# Patient Record
Sex: FEMALE | Race: WHITE | NOT HISPANIC OR LATINO | URBAN - METROPOLITAN AREA
[De-identification: names, ages, dates, MRNs, and addresses within clinical notes are randomized per-mention and may not be internally consistent; named-entity substitution may affect disease eponyms.]

---

## 2017-09-06 ENCOUNTER — INPATIENT (INPATIENT)
Facility: HOSPITAL | Age: 37
LOS: 2 days | Discharge: HOME | End: 2017-09-09
Attending: OBSTETRICS & GYNECOLOGY | Admitting: OBSTETRICS & GYNECOLOGY

## 2017-09-06 DIAGNOSIS — O99.89 OTHER SPECIFIED DISEASES AND CONDITIONS COMPLICATING PREGNANCY, CHILDBIRTH AND THE PUERPERIUM: ICD-10-CM

## 2017-09-12 DIAGNOSIS — Z34.83 ENCOUNTER FOR SUPERVISION OF OTHER NORMAL PREGNANCY, THIRD TRIMESTER: ICD-10-CM

## 2017-09-12 DIAGNOSIS — Z3A.38 38 WEEKS GESTATION OF PREGNANCY: ICD-10-CM

## 2019-03-25 ENCOUNTER — ASOB RESULT (OUTPATIENT)
Age: 39
End: 2019-03-25

## 2019-03-25 ENCOUNTER — APPOINTMENT (OUTPATIENT)
Dept: ANTEPARTUM | Facility: CLINIC | Age: 39
End: 2019-03-25
Payer: COMMERCIAL

## 2019-03-25 PROCEDURE — 76817 TRANSVAGINAL US OBSTETRIC: CPT | Mod: 77

## 2019-04-01 ENCOUNTER — APPOINTMENT (OUTPATIENT)
Dept: ANTEPARTUM | Facility: CLINIC | Age: 39
End: 2019-04-01

## 2019-12-02 ENCOUNTER — ASOB RESULT (OUTPATIENT)
Age: 39
End: 2019-12-02

## 2019-12-02 ENCOUNTER — APPOINTMENT (OUTPATIENT)
Dept: ANTEPARTUM | Facility: CLINIC | Age: 39
End: 2019-12-02
Payer: COMMERCIAL

## 2019-12-02 PROCEDURE — 76811 OB US DETAILED SNGL FETUS: CPT

## 2020-01-22 ENCOUNTER — APPOINTMENT (OUTPATIENT)
Dept: ANTEPARTUM | Facility: CLINIC | Age: 40
End: 2020-01-22
Payer: COMMERCIAL

## 2020-01-22 ENCOUNTER — ASOB RESULT (OUTPATIENT)
Age: 40
End: 2020-01-22

## 2020-01-22 PROCEDURE — 76816 OB US FOLLOW-UP PER FETUS: CPT

## 2020-02-24 ENCOUNTER — APPOINTMENT (OUTPATIENT)
Dept: ANTEPARTUM | Facility: CLINIC | Age: 40
End: 2020-02-24

## 2020-03-23 ENCOUNTER — INPATIENT (INPATIENT)
Facility: HOSPITAL | Age: 40
LOS: 1 days | Discharge: HOME | End: 2020-03-25
Attending: OBSTETRICS & GYNECOLOGY | Admitting: OBSTETRICS & GYNECOLOGY
Payer: MEDICAID

## 2020-03-23 VITALS
RESPIRATION RATE: 18 BRPM | WEIGHT: 169.98 LBS | DIASTOLIC BLOOD PRESSURE: 59 MMHG | TEMPERATURE: 98 F | SYSTOLIC BLOOD PRESSURE: 101 MMHG | HEIGHT: 68 IN | HEART RATE: 74 BPM

## 2020-03-23 LAB
AMPHET UR-MCNC: NEGATIVE — SIGNIFICANT CHANGE UP
APPEARANCE UR: CLEAR — SIGNIFICANT CHANGE UP
BARBITURATES UR SCN-MCNC: NEGATIVE — SIGNIFICANT CHANGE UP
BASOPHILS # BLD AUTO: 0.02 K/UL — SIGNIFICANT CHANGE UP (ref 0–0.2)
BASOPHILS NFR BLD AUTO: 0.2 % — SIGNIFICANT CHANGE UP (ref 0–1)
BENZODIAZ UR-MCNC: NEGATIVE — SIGNIFICANT CHANGE UP
BILIRUB UR-MCNC: NEGATIVE — SIGNIFICANT CHANGE UP
BLD GP AB SCN SERPL QL: SIGNIFICANT CHANGE UP
BUPRENORPHINE SCREEN, URINE RESULT: NEGATIVE — SIGNIFICANT CHANGE UP
COCAINE METAB.OTHER UR-MCNC: NEGATIVE — SIGNIFICANT CHANGE UP
COLOR SPEC: SIGNIFICANT CHANGE UP
DIFF PNL FLD: NEGATIVE — SIGNIFICANT CHANGE UP
EOSINOPHIL # BLD AUTO: 0.02 K/UL — SIGNIFICANT CHANGE UP (ref 0–0.7)
EOSINOPHIL NFR BLD AUTO: 0.2 % — SIGNIFICANT CHANGE UP (ref 0–8)
FENTANYL UR QL: NEGATIVE — SIGNIFICANT CHANGE UP
GLUCOSE UR QL: NEGATIVE — SIGNIFICANT CHANGE UP
HCT VFR BLD CALC: 37.4 % — SIGNIFICANT CHANGE UP (ref 37–47)
HGB BLD-MCNC: 12.9 G/DL — SIGNIFICANT CHANGE UP (ref 12–16)
IMM GRANULOCYTES NFR BLD AUTO: 0.7 % — HIGH (ref 0.1–0.3)
KETONES UR-MCNC: NEGATIVE — SIGNIFICANT CHANGE UP
L&D DRUG SCREEN, URINE: SIGNIFICANT CHANGE UP
LEUKOCYTE ESTERASE UR-ACNC: NEGATIVE — SIGNIFICANT CHANGE UP
LYMPHOCYTES # BLD AUTO: 1.07 K/UL — LOW (ref 1.2–3.4)
LYMPHOCYTES # BLD AUTO: 11.8 % — LOW (ref 20.5–51.1)
MCHC RBC-ENTMCNC: 31.8 PG — HIGH (ref 27–31)
MCHC RBC-ENTMCNC: 34.5 G/DL — SIGNIFICANT CHANGE UP (ref 32–37)
MCV RBC AUTO: 92.1 FL — SIGNIFICANT CHANGE UP (ref 81–99)
METHADONE UR-MCNC: NEGATIVE — SIGNIFICANT CHANGE UP
MONOCYTES # BLD AUTO: 0.69 K/UL — HIGH (ref 0.1–0.6)
MONOCYTES NFR BLD AUTO: 7.6 % — SIGNIFICANT CHANGE UP (ref 1.7–9.3)
NEUTROPHILS # BLD AUTO: 7.23 K/UL — HIGH (ref 1.4–6.5)
NEUTROPHILS NFR BLD AUTO: 79.5 % — HIGH (ref 42.2–75.2)
NITRITE UR-MCNC: NEGATIVE — SIGNIFICANT CHANGE UP
NRBC # BLD: 0 /100 WBCS — SIGNIFICANT CHANGE UP (ref 0–0)
OPIATES UR-MCNC: NEGATIVE — SIGNIFICANT CHANGE UP
OXYCODONE UR-MCNC: NEGATIVE — SIGNIFICANT CHANGE UP
PCP UR-MCNC: NEGATIVE — SIGNIFICANT CHANGE UP
PH UR: 7.5 — SIGNIFICANT CHANGE UP (ref 5–8)
PLATELET # BLD AUTO: 173 K/UL — SIGNIFICANT CHANGE UP (ref 130–400)
PRENATAL SYPHILIS TEST: SIGNIFICANT CHANGE UP
PROPOXYPHENE QUALITATIVE URINE RESULT: NEGATIVE — SIGNIFICANT CHANGE UP
PROT UR-MCNC: NEGATIVE — SIGNIFICANT CHANGE UP
RBC # BLD: 4.06 M/UL — LOW (ref 4.2–5.4)
RBC # FLD: 13.5 % — SIGNIFICANT CHANGE UP (ref 11.5–14.5)
SP GR SPEC: 1.01 — LOW (ref 1.01–1.02)
UROBILINOGEN FLD QL: SIGNIFICANT CHANGE UP
WBC # BLD: 9.09 K/UL — SIGNIFICANT CHANGE UP (ref 4.8–10.8)
WBC # FLD AUTO: 9.09 K/UL — SIGNIFICANT CHANGE UP (ref 4.8–10.8)

## 2020-03-23 PROCEDURE — 59409 OBSTETRICAL CARE: CPT | Mod: U7

## 2020-03-23 RX ORDER — SIMETHICONE 80 MG/1
80 TABLET, CHEWABLE ORAL EVERY 4 HOURS
Refills: 0 | Status: DISCONTINUED | OUTPATIENT
Start: 2020-03-23 | End: 2020-03-25

## 2020-03-23 RX ORDER — AMPICILLIN TRIHYDRATE 250 MG
2 CAPSULE ORAL ONCE
Refills: 0 | Status: COMPLETED | OUTPATIENT
Start: 2020-03-23 | End: 2020-03-23

## 2020-03-23 RX ORDER — ACETAMINOPHEN 500 MG
975 TABLET ORAL
Refills: 0 | Status: DISCONTINUED | OUTPATIENT
Start: 2020-03-23 | End: 2020-03-25

## 2020-03-23 RX ORDER — AMPICILLIN TRIHYDRATE 250 MG
CAPSULE ORAL
Refills: 0 | Status: DISCONTINUED | OUTPATIENT
Start: 2020-03-23 | End: 2020-03-23

## 2020-03-23 RX ORDER — MAGNESIUM HYDROXIDE 400 MG/1
30 TABLET, CHEWABLE ORAL
Refills: 0 | Status: DISCONTINUED | OUTPATIENT
Start: 2020-03-23 | End: 2020-03-25

## 2020-03-23 RX ORDER — IBUPROFEN 200 MG
600 TABLET ORAL EVERY 6 HOURS
Refills: 0 | Status: DISCONTINUED | OUTPATIENT
Start: 2020-03-23 | End: 2020-03-25

## 2020-03-23 RX ORDER — AER TRAVELER 0.5 G/1
1 SOLUTION RECTAL; TOPICAL EVERY 4 HOURS
Refills: 0 | Status: DISCONTINUED | OUTPATIENT
Start: 2020-03-23 | End: 2020-03-25

## 2020-03-23 RX ORDER — KETOROLAC TROMETHAMINE 30 MG/ML
30 SYRINGE (ML) INJECTION ONCE
Refills: 0 | Status: DISCONTINUED | OUTPATIENT
Start: 2020-03-23 | End: 2020-03-23

## 2020-03-23 RX ORDER — OXYCODONE HYDROCHLORIDE 5 MG/1
5 TABLET ORAL
Refills: 0 | Status: DISCONTINUED | OUTPATIENT
Start: 2020-03-23 | End: 2020-03-25

## 2020-03-23 RX ORDER — GLYCERIN ADULT
1 SUPPOSITORY, RECTAL RECTAL AT BEDTIME
Refills: 0 | Status: DISCONTINUED | OUTPATIENT
Start: 2020-03-23 | End: 2020-03-25

## 2020-03-23 RX ORDER — IBUPROFEN 200 MG
600 TABLET ORAL EVERY 6 HOURS
Refills: 0 | Status: COMPLETED | OUTPATIENT
Start: 2020-03-23 | End: 2021-02-19

## 2020-03-23 RX ORDER — DIPHENHYDRAMINE HCL 50 MG
25 CAPSULE ORAL EVERY 6 HOURS
Refills: 0 | Status: DISCONTINUED | OUTPATIENT
Start: 2020-03-23 | End: 2020-03-25

## 2020-03-23 RX ORDER — OXYTOCIN 10 UNIT/ML
333.33 VIAL (ML) INJECTION
Qty: 20 | Refills: 0 | Status: DISCONTINUED | OUTPATIENT
Start: 2020-03-23 | End: 2020-03-23

## 2020-03-23 RX ORDER — LANOLIN
1 OINTMENT (GRAM) TOPICAL EVERY 6 HOURS
Refills: 0 | Status: DISCONTINUED | OUTPATIENT
Start: 2020-03-23 | End: 2020-03-25

## 2020-03-23 RX ORDER — OXYTOCIN 10 UNIT/ML
1 VIAL (ML) INJECTION
Qty: 30 | Refills: 0 | Status: DISCONTINUED | OUTPATIENT
Start: 2020-03-23 | End: 2020-03-23

## 2020-03-23 RX ORDER — SODIUM CHLORIDE 9 MG/ML
1000 INJECTION, SOLUTION INTRAVENOUS
Refills: 0 | Status: DISCONTINUED | OUTPATIENT
Start: 2020-03-23 | End: 2020-03-23

## 2020-03-23 RX ORDER — OXYTOCIN 10 UNIT/ML
41.67 VIAL (ML) INJECTION
Qty: 20 | Refills: 0 | Status: DISCONTINUED | OUTPATIENT
Start: 2020-03-23 | End: 2020-03-25

## 2020-03-23 RX ORDER — BENZOCAINE 10 %
1 GEL (GRAM) MUCOUS MEMBRANE EVERY 6 HOURS
Refills: 0 | Status: DISCONTINUED | OUTPATIENT
Start: 2020-03-23 | End: 2020-03-25

## 2020-03-23 RX ORDER — OXYCODONE HYDROCHLORIDE 5 MG/1
5 TABLET ORAL ONCE
Refills: 0 | Status: DISCONTINUED | OUTPATIENT
Start: 2020-03-23 | End: 2020-03-25

## 2020-03-23 RX ORDER — DIBUCAINE 1 %
1 OINTMENT (GRAM) RECTAL EVERY 6 HOURS
Refills: 0 | Status: DISCONTINUED | OUTPATIENT
Start: 2020-03-23 | End: 2020-03-25

## 2020-03-23 RX ORDER — HYDROCORTISONE 1 %
1 OINTMENT (GRAM) TOPICAL EVERY 6 HOURS
Refills: 0 | Status: DISCONTINUED | OUTPATIENT
Start: 2020-03-23 | End: 2020-03-25

## 2020-03-23 RX ORDER — AMPICILLIN TRIHYDRATE 250 MG
1 CAPSULE ORAL EVERY 4 HOURS
Refills: 0 | Status: DISCONTINUED | OUTPATIENT
Start: 2020-03-23 | End: 2020-03-23

## 2020-03-23 RX ADMIN — Medication 216 GRAM(S): at 13:50

## 2020-03-23 RX ADMIN — Medication 1 MILLIUNIT(S)/MIN: at 16:04

## 2020-03-23 RX ADMIN — Medication 208 GRAM(S): at 17:50

## 2020-03-23 NOTE — OB PROVIDER H&P - HISTORY OF PRESENT ILLNESS
38y.o.  @ [address] [place] [address] 38y.o.  @ 38.6wks by 8wk charmaine presents c/o LOF at 0930am, clear fluid. Pt reports occ ctx, denies VB and states good FM.

## 2020-03-23 NOTE — OB PROVIDER H&P - NSHPPHYSICALEXAM_GEN_ALL_CORE
T(C): 36.8 (03-23-20 @ 13:21), Max: 36.8 (03-23-20 @ 13:21)  HR: 74 (03-23-20 @ 13:31) (74 - 74)  BP: 101/59 (03-23-20 @ 13:31) (101/59 - 101/59)  RR: 18 (03-23-20 @ 13:21) (18 - 18)    VE: Grossly ruptured, clear fluid  VE: 2/70/-2 by Dr. Bailon  Ctx: irregular ctx noted  FHR: 130 moderate variability, Cat I

## 2020-03-23 NOTE — OB PROVIDER H&P - NSHPLABSRESULTS_GEN_ALL_CORE
Sono @ 25.4wks S=D, post placenta previa, nl anatomy    GCT 72 Sono @ 25.4wks S=D, post placenta previa, nl anatomy  Sono @ 30.1wks S=D, EFW: 1564g, 3lb7oz (46%), post placenta, no previa    GCT 72

## 2020-03-23 NOTE — OB PROVIDER H&P - NS_OBGYNHISTORY_OBGYN_ALL_OB_FT
x 7, FT largest 8lb4oz   SAB x 1 w/ D&C      Denies STI, PID, abnl PAP, fibroids, cysts  x 7, FT largest 8lb4oz   SAB x 1 no D&C, 1st trimester      Denies STI, PID, abnl PAP, fibroids, cysts

## 2020-03-23 NOTE — OB RN PATIENT PROFILE - NS_ADMITROM_OBGYN_ALL_OB
2 week check. Practiced insertion a little more. Changed dome size to make it slide in easier. 16 hours/day wear time.    Hearing Aid Summary          Specifications  Dome Size - Left: Small Open  Dome Size - Right: Small Open          Tried setting up Bluetooth but patient needs password for [employer].  
Yes

## 2020-03-23 NOTE — OB PROVIDER DELIVERY SUMMARY - NSPROVIDERDELIVERYNOTE_OBGYN_ALL_OB_FT
Patient was fully dilated and pushing. Fetal head was OA and restituted to ROT. The anterior and posterior shoulders delivered, followed by the remaining body atraumatically. Delayed cord clamping was performed, and then clamped and cut. Cord blood gases collected x2. The  was handed to the mother. The placenta delivered intact with membranes. Pitocin was administered. Uterus massaged, fundus found to be firm. Cervix, vagina and perineum inspected. First degree laceration noted, repaired using 3-0 chromic in the usual fashion with good hemostasis.     Viable female infant delivered, with APGARs 9/9    Laceration: first degree laceration   cc

## 2020-03-23 NOTE — OB PROVIDER H&P - ASSESSMENT
38y.o.  @ 38.6wks PROM, AMA, Grandmultiparity  Admit to L&D  IVF, labs  Continuous efm and toco  Pain management PRN  Anticipate   Dr. Bailon Aware

## 2020-03-23 NOTE — PROGRESS NOTE ADULT - SUBJECTIVE AND OBJECTIVE BOX
PGY1 Note    Patient seen at bedside for evaluation of labor progression, doing well, no complaints. Patient refusing epidural at this time.    Vital Signs Last 24 Hrs  T(C): 36.8 (23 Mar 2020 13:21), Max: 36.8 (23 Mar 2020 13:21)  T(F): 98.3 (23 Mar 2020 13:21), Max: 98.3 (23 Mar 2020 13:21)  HR: 71 (23 Mar 2020 15:39) (71 - 74)  BP: 102/51 (23 Mar 2020 15:39) (101/59 - 102/51)  RR: 18 (23 Mar 2020 13:21) (18 - 18)    EFM: 140/mod lily/+early decels, +accels  TOCO:q3-4 mins  SVE: 5/90/-1, vtx, PROM clear @0900    Medications:  ampicillin  IVPB: 216 mL/Hr (20 @ 13:50)  ampicillin  IVPB: 208 mL/Hr (20 @ 17:50)  oxytocin Infusion: 1 mL/Hr (20 @ 15:53) currently @3mU/min      Labs:                        12.9   9.09  )-----------( 173      ( 23 Mar 2020 14:00 )             37.4           ABO RH Interpretation: O POS (20 @ 14:06)    Antibody Screen: NEG (20 @ 14:06)    Urinalysis Basic - ( 23 Mar 2020 14:00 )    Color: Light Yellow / Appearance: Clear / S.008 / pH: x  Gluc: x / Ketone: Negative  / Bili: Negative / Urobili: <2 mg/dL   Blood: x / Protein: Negative / Nitrite: Negative   Leuk Esterase: Negative / RBC: x / WBC x   Sq Epi: x / Non Sq Epi: x / Bacteria: x        Prenatal Syphilis Test: Nonreact (20 @ 14:00)    UDS received          A/P:   39y.o.  @ 38.6wks PROM, AMA, Grandmultiparity, on pitocin    -continue pitocin  -pain management prn  -cont efm/toco  -f/u UDS  -cont to monitor vitals  -cont iv hydration    Dr. Tran and Dr. Bailon aware

## 2020-03-24 LAB
BASOPHILS # BLD AUTO: 0.03 K/UL — SIGNIFICANT CHANGE UP (ref 0–0.2)
BASOPHILS NFR BLD AUTO: 0.3 % — SIGNIFICANT CHANGE UP (ref 0–1)
EOSINOPHIL # BLD AUTO: 0.03 K/UL — SIGNIFICANT CHANGE UP (ref 0–0.7)
EOSINOPHIL NFR BLD AUTO: 0.3 % — SIGNIFICANT CHANGE UP (ref 0–8)
HCT VFR BLD CALC: 30.2 % — LOW (ref 37–47)
HGB BLD-MCNC: 10.3 G/DL — LOW (ref 12–16)
IMM GRANULOCYTES NFR BLD AUTO: 0.8 % — HIGH (ref 0.1–0.3)
LYMPHOCYTES # BLD AUTO: 1.31 K/UL — SIGNIFICANT CHANGE UP (ref 1.2–3.4)
LYMPHOCYTES # BLD AUTO: 11.3 % — LOW (ref 20.5–51.1)
MCHC RBC-ENTMCNC: 31.2 PG — HIGH (ref 27–31)
MCHC RBC-ENTMCNC: 34.1 G/DL — SIGNIFICANT CHANGE UP (ref 32–37)
MCV RBC AUTO: 91.5 FL — SIGNIFICANT CHANGE UP (ref 81–99)
MONOCYTES # BLD AUTO: 0.92 K/UL — HIGH (ref 0.1–0.6)
MONOCYTES NFR BLD AUTO: 7.9 % — SIGNIFICANT CHANGE UP (ref 1.7–9.3)
NEUTROPHILS # BLD AUTO: 9.24 K/UL — HIGH (ref 1.4–6.5)
NEUTROPHILS NFR BLD AUTO: 79.4 % — HIGH (ref 42.2–75.2)
NRBC # BLD: 0 /100 WBCS — SIGNIFICANT CHANGE UP (ref 0–0)
PLATELET # BLD AUTO: 144 K/UL — SIGNIFICANT CHANGE UP (ref 130–400)
RBC # BLD: 3.3 M/UL — LOW (ref 4.2–5.4)
RBC # FLD: 13.4 % — SIGNIFICANT CHANGE UP (ref 11.5–14.5)
WBC # BLD: 11.62 K/UL — HIGH (ref 4.8–10.8)
WBC # FLD AUTO: 11.62 K/UL — HIGH (ref 4.8–10.8)

## 2020-03-24 PROCEDURE — 99231 SBSQ HOSP IP/OBS SF/LOW 25: CPT

## 2020-03-24 RX ADMIN — Medication 975 MILLIGRAM(S): at 03:02

## 2020-03-24 RX ADMIN — Medication 975 MILLIGRAM(S): at 03:32

## 2020-03-24 RX ADMIN — Medication 600 MILLIGRAM(S): at 06:25

## 2020-03-24 RX ADMIN — Medication 600 MILLIGRAM(S): at 05:55

## 2020-03-24 NOTE — PROGRESS NOTE ADULT - SUBJECTIVE AND OBJECTIVE BOX
OB attending  PPD #1    Pt doing well, pain well controlled. No overnight events, no acute complaints.    Ambulating: Yes  Voiding: Yes  Flatus: Yes  Bowel movements: Yes   Breast or bottle feeding: Breastfeeding  Diet: Regular    PAST MEDICAL & SURGICAL HISTORY:  No pertinent past medical history  No significant past surgical history      Physical Exam  Vital Signs Last 24 Hrs  T(C): 36.6 (23 Mar 2020 23:43), Max: 37.0 (23 Mar 2020 16:58)  T(F): 97.9 (23 Mar 2020 23:43), Max: 98.6 (23 Mar 2020 16:58)  HR: 82 (23 Mar 2020 23:43) (63 - 92)  BP: 102/56 (23 Mar 2020 23:43) (101/59 - 130/68)  BP(mean): --  RR: 18 (23 Mar 2020 23:43) (12 - 18)  SpO2: --  Gen: AAOx3, NAD  Abd: Soft, nontender, nondistended, BS+  Fundus: Firm, below umbilicus  Lochia: normal  Ext: No calf tenderness, no swelling    Labs:                        10.3   11.62 )-----------( 144      ( 24 Mar 2020 05:36 )             30.2         A/P: 40 yo P8, s/p , PPD #1, doing well  - continue current management

## 2020-03-25 ENCOUNTER — TRANSCRIPTION ENCOUNTER (OUTPATIENT)
Age: 40
End: 2020-03-25

## 2020-03-25 VITALS
DIASTOLIC BLOOD PRESSURE: 68 MMHG | SYSTOLIC BLOOD PRESSURE: 111 MMHG | HEART RATE: 76 BPM | TEMPERATURE: 96 F | RESPIRATION RATE: 18 BRPM

## 2020-03-25 PROCEDURE — 99231 SBSQ HOSP IP/OBS SF/LOW 25: CPT

## 2020-03-25 RX ORDER — ACETAMINOPHEN 500 MG
500 TABLET ORAL
Qty: 0 | Refills: 0 | DISCHARGE
Start: 2020-03-25

## 2020-03-25 RX ORDER — BENZOCAINE 10 %
1 GEL (GRAM) MUCOUS MEMBRANE
Qty: 0 | Refills: 0 | DISCHARGE
Start: 2020-03-25

## 2020-03-25 RX ORDER — IBUPROFEN 200 MG
600 TABLET ORAL
Qty: 0 | Refills: 0 | DISCHARGE
Start: 2020-03-25

## 2020-03-25 NOTE — DISCHARGE NOTE OB - CARE PROVIDER_API CALL
Jorge Escalera)  Obstetrics and Gynecology  [address] [place] [address]  Phone: [phone]  Fax: [phone]  Follow Up Time:

## 2020-03-25 NOTE — DISCHARGE NOTE OB - PATIENT PORTAL LINK FT
You can access the FollowMyHealth Patient Portal offered by [facility] by registering at the following website: [email or web address]. By joining [employer]’s FollowMyHealth portal, you will also be able to view your health information using other applications (apps) compatible with our system.

## 2020-03-25 NOTE — DISCHARGE NOTE OB - MEDICATION SUMMARY - MEDICATIONS TO TAKE
I will START or STAY ON the medications listed below when I get home from the hospital:    ibuprofen  -- 600 milligram(s) by mouth every 6 hours, As Needed  -- Indication: For vaginal delivery    acetaminophen  -- 500 milligram(s) by mouth every 6 hours, As Needed  -- Indication: For vaginal delivery    benzocaine 20% topical spray  -- 1 spray(s) on skin every 6 hours, As needed, for Perineal discomfort  -- Indication: For vaginal delivery

## 2020-03-25 NOTE — PROGRESS NOTE ADULT - SUBJECTIVE AND OBJECTIVE BOX
Subjective:   Patient doing well. No complaints. Minimal lochia. Pain controlled.    Objective:   T(F): 96.5 ( @ 07:48), Max: 97.5 ( @ 23:34)  HR: 76 ( @ 07:48)  BP: 111/68 ( @ 07:48) (102/56 - 116/71)  RR: 18 ( @ 07:48)  SpO2: --  Gen: AAOx3, NAD  Abd: Soft, Nontender, Nondistended, Fundus firm below the umbilicus  Ext: no tender, mild edema  Min Lochia Rubra    Labs:                        10.3   11.62 )-----------( 144      ( 24 Mar 2020 05:36 )             30.2             Tolerating regular diet  Passed flatus, passed bowel movement  Breast/Bottle feeding    Assessment:   39y s/p , PPD#2, doing well    Plan:  -Routine postpartum care  -Encouraged ambulation and PO hydration  -Tolerating regular diet

## 2020-03-27 DIAGNOSIS — Z3A.38 38 WEEKS GESTATION OF PREGNANCY: ICD-10-CM

## 2020-09-08 ENCOUNTER — APPOINTMENT (OUTPATIENT)
Dept: VASCULAR SURGERY | Facility: CLINIC | Age: 40
End: 2020-09-08

## 2022-03-02 NOTE — ASU PATIENT PROFILE, ADULT - FALL HARM RISK - UNIVERSAL INTERVENTIONS
Bed in lowest position, wheels locked, appropriate side rails in place/Call bell, personal items and telephone in reach/Instruct patient to call for assistance before getting out of bed or chair/Non-slip footwear when patient is out of bed/[place] to call system/Physically safe environment - no spills, clutter or unnecessary equipment/Purposeful Proactive Rounding/Room/bathroom lighting operational, light cord in reach

## 2022-03-03 ENCOUNTER — RESULT REVIEW (OUTPATIENT)
Age: 42
End: 2022-03-03

## 2022-03-03 ENCOUNTER — TRANSCRIPTION ENCOUNTER (OUTPATIENT)
Age: 42
End: 2022-03-03

## 2022-03-03 ENCOUNTER — OUTPATIENT (OUTPATIENT)
Dept: OUTPATIENT SERVICES | Facility: HOSPITAL | Age: 42
LOS: 1 days | Discharge: HOME | End: 2022-03-03
Payer: MEDICAID

## 2022-03-03 VITALS
SYSTOLIC BLOOD PRESSURE: 142 MMHG | RESPIRATION RATE: 20 BRPM | DIASTOLIC BLOOD PRESSURE: 86 MMHG | OXYGEN SATURATION: 100 % | HEIGHT: 69 IN | TEMPERATURE: 99 F | HEART RATE: 91 BPM | WEIGHT: 149.91 LBS

## 2022-03-03 VITALS
SYSTOLIC BLOOD PRESSURE: 118 MMHG | OXYGEN SATURATION: 99 % | HEART RATE: 80 BPM | DIASTOLIC BLOOD PRESSURE: 74 MMHG | RESPIRATION RATE: 15 BRPM

## 2022-03-03 PROCEDURE — 58120 DILATION AND CURETTAGE: CPT

## 2022-03-03 PROCEDURE — 88304 TISSUE EXAM BY PATHOLOGIST: CPT | Mod: 26

## 2022-03-03 RX ORDER — HYDROMORPHONE HYDROCHLORIDE 2 MG/ML
0.5 INJECTION INTRAMUSCULAR; INTRAVENOUS; SUBCUTANEOUS
Refills: 0 | Status: DISCONTINUED | OUTPATIENT
Start: 2022-03-03 | End: 2022-03-03

## 2022-03-03 RX ORDER — SODIUM CHLORIDE 9 MG/ML
1000 INJECTION, SOLUTION INTRAVENOUS
Refills: 0 | Status: DISCONTINUED | OUTPATIENT
Start: 2022-03-03 | End: 2022-03-17

## 2022-03-03 RX ORDER — ONDANSETRON 8 MG/1
4 TABLET, FILM COATED ORAL ONCE
Refills: 0 | Status: DISCONTINUED | OUTPATIENT
Start: 2022-03-03 | End: 2022-03-17

## 2022-03-03 RX ORDER — CEFAZOLIN SODIUM 1 G
2000 VIAL (EA) INJECTION ONCE
Refills: 0 | Status: DISCONTINUED | OUTPATIENT
Start: 2022-03-03 | End: 2022-03-03

## 2022-03-03 RX ORDER — CEFAZOLIN SODIUM 1 G
1000 VIAL (EA) INJECTION ONCE
Refills: 0 | Status: COMPLETED | OUTPATIENT
Start: 2022-03-03 | End: 2022-03-03

## 2022-03-03 RX ORDER — ACETAMINOPHEN 500 MG
650 TABLET ORAL ONCE
Refills: 0 | Status: DISCONTINUED | OUTPATIENT
Start: 2022-03-03 | End: 2022-03-17

## 2022-03-03 RX ORDER — MORPHINE SULFATE 50 MG/1
2 CAPSULE, EXTENDED RELEASE ORAL
Refills: 0 | Status: DISCONTINUED | OUTPATIENT
Start: 2022-03-03 | End: 2022-03-03

## 2022-03-03 RX ADMIN — SODIUM CHLORIDE 100 MILLILITER(S): 9 INJECTION, SOLUTION INTRAVENOUS at 14:02

## 2022-03-03 RX ADMIN — Medication 100 MILLIGRAM(S): at 14:51

## 2022-03-03 NOTE — BRIEF OPERATIVE NOTE - NSICDXBRIEFPOSTOP_GEN_ALL_CORE_FT
POST-OP DIAGNOSIS:  Retained products of conception after miscarriage 03-Mar-2022 14:05:06  Edwina Tabares

## 2022-03-03 NOTE — ASU DISCHARGE PLAN (ADULT/PEDIATRIC) - NS MD DC FALL RISK RISK
For information on Fall & Injury Prevention, visit: [email or web address] OR  [email or web address] OR  https://www.cdc.gov/steadi/patient.html

## 2022-03-03 NOTE — BRIEF OPERATIVE NOTE - NSICDXBRIEFPREOP_GEN_ALL_CORE_FT
PRE-OP DIAGNOSIS:  Retained products of conception after miscarriage 03-Mar-2022 14:04:55  Edwina Tabares

## 2022-03-03 NOTE — BRIEF OPERATIVE NOTE - NSICDXBRIEFPROCEDURE_GEN_ALL_CORE_FT
PROCEDURES:  Dilation and curettage for retained products of conception 03-Mar-2022 14:04:42  Edwina Tabares

## 2022-03-03 NOTE — PRE-ANESTHESIA EVALUATION ADULT - NSANTHOSAYNRD_GEN_A_CORE
No. VESTA screening performed.  STOP BANG Legend: 0-2 = LOW Risk; 3-4 = INTERMEDIATE Risk; 5-8 = HIGH Risk

## 2022-03-03 NOTE — ASU PREOP CHECKLIST - BSA (M2)
Patient called to report a bump on right top portion of breast   Ok per Chris to put on Wong's schedule today @ 1:00pm in [place].83 No

## 2022-03-03 NOTE — BRIEF OPERATIVE NOTE - COMMENTS
dictation #[id number] dictation #[id number]  patient underwent uncomplicated d&c for retained poc, tolerated well.  Rh +

## 2022-03-03 NOTE — ASU DISCHARGE PLAN (ADULT/PEDIATRIC) - CARE PROVIDER_API CALL
Jorge Escalera)  Obstetrics and Gynecology  [address] [place] [address]  Phone: [phone]  Fax: [phone]  Follow Up Time: 2 weeks

## 2022-03-03 NOTE — H&P ADULT - HISTORY OF PRESENT ILLNESS
40 y/o [id number] s/p completed ab with continued bleeding and tissue noted in utero.  no other complaints

## 2022-03-03 NOTE — H&P ADULT - NSICDXFAMILYHX_GEN_ALL_CORE_FT
"              After Visit Summary   2018    Florencia Ross    MRN: [id number]           Patient Information     Date Of Birth          1951        Visit Information        Provider Department      2018 9:30 AM Phuong Persaud MD [facility]        Today's Diagnoses     Hypertension goal BP (blood pressure) < 140/90    -  1    Advanced directives, counseling/discussion        CARDIOVASCULAR SCREENING; LDL GOAL LESS THAN 130          Care Instructions    Continue your calcium and vitamin D supplements (and include this in your diet): 1,200mg of calcium daily and vitamin D: 800-2,000 units per day. Try to engage in weight bearing exercise. Given that your recent vitamin D level is within normal limits, you may continue your current vitamin D amount.   Your bone density test to check for osteoporosis (called the \"DEXA scan\") is due in .    Please complete the Prevnar 13 pneumonia vaccine soon.    We do advise at least once every 6 month visits for chronic conditions.  (You may schedule a physical on 18 or later, with fasting labs a few days before). We can do a skin check at the physical appointment.              Monthly Mole Check Chart  Anyone can get skin cancer. It doesn t matter what your skin color is. Doing a monthly skin check is an important way to spot early signs of melanoma. Melanoma is the deadliest type of skin cancer. But if it s found early, it can be treated. Regular skin checks can help you track any changes in your skin.  Getting started  Each month, check your body for any spots such as freckles, age spots, and moles. Use this sheet to help you by doing the followin. Number each spot you find on the images below.  2. Record the details for each spot, along with the date, on the chart at the bottom of the page.  3. Keep all of your completed charts. This will help you track any changes in your skin over time.  What to look for  When doing a skin " check, be sure to use the ABCDEs of melanoma. This means checking spots and moles for the following:      Asymmetry. One half of the mole is not like the other half.    Border. The edges are not smooth but ragged, notched, or blurred.    Color. Color varies from 1 part of the mole to another and may be tan, brown, or black. In some cases the color can be white, red, or blue.    Diameter. The mole is larger than 6 mm (size of a pencil eraser).    Evolving. The mole is getting larger or changing its shape or color.  How to check  Stand before a full-length mirror and check all parts of your body. For spots on your back or other areas you can't see, have a family member or friend do this for you. Or you can also use a hand mirror to check hard-to-see areas such as your back, buttocks, back of the neck, and scalp. To get a better look when checking your scalp, part your hair.  When to call your healthcare provider  Call your healthcare provider if any of your moles:    Hurt    Itch    Ooze    Bleed    Thicken    Become crusty    Show any of the ABCDEs of melanoma  Monthly Skin Check Chart  Date       Mole #    Asymmetry:  What is the mole's shape? Border of mole Color of mole Diameter of mole Evolving: How has mole changed?                                                                                                   Date Last Reviewed: 3/1/2017    0957-4134 [employer]. [address] [place] [address]. All rights reserved. This information is not intended as a substitute for professional medical care. Always follow your healthcare professional's instructions.                Follow-ups after your visit        Future tests that were ordered for you today     Open Future Orders        Priority Expected Expires Ordered    **Basic metabolic panel FUTURE 6mo Routine 11/13/2018 12/13/2018 5/17/2018    Lipid panel reflex to direct LDL Fasting Routine 11/13/2018 12/13/2018 5/17/2018            Who to  contact     If you have questions or need follow up information about today's clinic visit or your schedule please contact [facility] directly at [phone].  Normal or non-critical lab and imaging results will be communicated to you by MyChart, letter or phone within 4 business days after the clinic has received the results. If you do not hear from us within 7 days, please contact the clinic through [employer] or phone. If you have a critical or abnormal lab result, we will notify you by phone as soon as possible.  Submit refill requests through [employer] or call your pharmacy and they will forward the refill request to us. Please allow 3 business days for your refill to be completed.          Additional Information About Your Visit        [employer] Information     iversity gives you secure access to your electronic health record. If you see a primary care provider, you can also send messages to your care team and make appointments. If you have questions, please call your primary care clinic.  If you do not have a primary care provider, please call [phone] and they will assist you.        Care EveryWhere ID     This is your Care EveryWhere ID. This could be used by other organizations to access your [place] medical records  [id number]        Your Vitals Were     Pulse Temperature Respirations Pulse Oximetry Breastfeeding? BMI (Body Mass Index)    63 98.9  F (37.2  C) (Oral) 18 98% No 23.13 kg/m2       Blood Pressure from Last 3 Encounters:   05/17/18 132/77   03/11/18 195/90   03/08/18 137/77    Weight from Last 3 Encounters:   05/17/18 156 lb (70.8 kg)   03/11/18 156 lb 6 oz (70.9 kg)   03/08/18 157 lb (71.2 kg)                 Today's Medication Changes          These changes are accurate as of 5/17/18 10:03 AM.  If you have any questions, ask your nurse or doctor.               These medicines have changed or have updated prescriptions.        Dose/Directions    aspirin 81 MG EC tablet   This may  have changed:    - when to take this  - additional instructions   Used for:  Hypertension goal BP (blood pressure) < 140/90        Dose:  81 mg   Take 1 tablet by mouth daily.   Quantity:  90 tablet   Refills:  3                Primary Care Provider Office Phone # Fax #    Phuong Persaud [phone] [address] [facility] [address]        Equal Access to Services     ANGELINE ZHANG : Hadii aad ku hadasho Soomaali, waaxda luqadaha, qaybta kaalmada adeegyada, waxay idiin hayjaunn adeeg ngozi cristina . So [facility] [phone].    ATENCIÓN: Si angely [place], tiene a benjamin disposición servicios gratuitos de asistencia lingüística. Llame al [phone].    We comply with applicable federal civil rights laws and [place] laws. We do not discriminate on the basis of race, color, national origin, age, disability, sex, sexual orientation, or gender identity.            Thank you!     Thank you for choosing [facility]  for your care. Our goal is always to provide you with excellent care. Hearing back from our patients is one way we can continue to improve our services. Please take a few minutes to complete the written survey that you may receive in the mail after your visit with us. Thank you!             Your Updated Medication List - Protect others around you: Learn how to safely use, store and throw away your medicines at www.disposemymeds.org.          This list is accurate as of 5/17/18 10:03 AM.  Always use your most recent med list.                   Brand Name Dispense Instructions for use Diagnosis    acyclovir 400 MG tablet    ZOVIRAX    180 tablet    Take 1 tablet (400 mg) by mouth 2 times daily    Recurrent genital herpes       aspirin 81 MG EC tablet     90 tablet    Take 1 tablet by mouth daily.    Hypertension goal BP (blood pressure) < 140/90       ONE-A-DAY WOMENS PO      Take  by mouth.        triamterene-hydrochlorothiazide 37.5-25 MG per tablet    MAXZIDE-25     90 tablet    Take 1 tablet by mouth daily    Hypertension goal BP (blood pressure) < 140/90       TUMS PO      As needed        Vitamin D-3 1000 units Caps      1,000 Units           FAMILY HISTORY:  No pertinent family history in first degree relatives, None

## 2022-03-04 LAB — SURGICAL PATHOLOGY STUDY: SIGNIFICANT CHANGE UP

## 2022-03-09 DIAGNOSIS — O03.4 INCOMPLETE SPONTANEOUS ABORTION WITHOUT COMPLICATION: ICD-10-CM

## 2023-02-02 PROBLEM — Z78.9 OTHER SPECIFIED HEALTH STATUS: Chronic | Status: ACTIVE | Noted: 2020-03-23

## 2023-03-29 ENCOUNTER — APPOINTMENT (OUTPATIENT)
Dept: OBGYN | Facility: CLINIC | Age: 43
End: 2023-03-29
Payer: MEDICAID

## 2023-03-29 VITALS
DIASTOLIC BLOOD PRESSURE: 85 MMHG | SYSTOLIC BLOOD PRESSURE: 128 MMHG | HEIGHT: 68 IN | WEIGHT: 139 LBS | BODY MASS INDEX: 21.07 KG/M2

## 2023-03-29 LAB
BILIRUB UR QL STRIP: NORMAL
GLUCOSE UR-MCNC: NORMAL
HCG UR QL: 0.2 EU/DL
HCG UR QL: NEGATIVE
HGB UR QL STRIP.AUTO: NORMAL
KETONES UR-MCNC: NORMAL
LEUKOCYTE ESTERASE UR QL STRIP: NORMAL
NITRITE UR QL STRIP: NORMAL
PH UR STRIP: 6
PROT UR STRIP-MCNC: NORMAL
SP GR UR STRIP: 1.02

## 2023-03-29 PROCEDURE — 99396 PREV VISIT EST AGE 40-64: CPT

## 2023-03-29 PROCEDURE — 82270 OCCULT BLOOD FECES: CPT

## 2023-03-29 PROCEDURE — 81025 URINE PREGNANCY TEST: CPT

## 2023-03-29 PROCEDURE — 81003 URINALYSIS AUTO W/O SCOPE: CPT | Mod: QW

## 2023-03-30 NOTE — HISTORY OF PRESENT ILLNESS
[FreeTextEntry1] : , lmp 3/12/23, Pt here for annual exam no abnormal vaginal bleeding, pelvic pain or vaginal discharge.\par Nsdx 8, Pap 2020, no hx kelsy/hpv, No Bc \par Med/Surg Hx -D&C\par NKDA \par \par 
presents with c/o generalized abd pain x yesterday, patient admits drinking beer yesterday, no nausea/ vomiting, headache vision changes noted.

## 2023-03-30 NOTE — PHYSICAL EXAM
[Chaperone Present] : A chaperone was present in the examining room during all aspects of the physical examination [Appropriately responsive] : appropriately responsive [Alert] : alert [No Acute Distress] : no acute distress [No Lymphadenopathy] : no lymphadenopathy [Soft] : soft [Non-tender] : non-tender [Non-distended] : non-distended [No HSM] : No HSM [No Lesions] : no lesions [No Mass] : no mass [Oriented x3] : oriented x3 [Examination Of The Breasts] : a normal appearance [No Masses] : no breast masses were palpable [Labia Majora] : normal [Labia Minora] : normal [Normal] : normal [Uterine Adnexae] : normal [No Tenderness] : no tenderness [Nl Sphincter Tone] : normal sphincter tone [FreeTextEntry9] : Guaiac negative

## 2023-03-30 NOTE — PLAN
[FreeTextEntry1] : Normal Exam \par -Pap done\par -Breast self exam reviewed \par -Gc Ct sent \par -for screening mammogram \par -return visit PRN or 1 year\par

## 2023-04-11 LAB
C TRACH RRNA SPEC QL NAA+PROBE: NOT DETECTED
CYTOLOGY CVX/VAG DOC THIN PREP: NORMAL
HPV HIGH+LOW RISK DNA PNL CVX: NOT DETECTED
N GONORRHOEA RRNA SPEC QL NAA+PROBE: NOT DETECTED
SOURCE TP AMPLIFICATION: NORMAL

## 2023-05-19 RX ORDER — NORETHINDRONE ACETATE 5 MG/1
5 TABLET ORAL DAILY
Qty: 60 | Refills: 0 | Status: ACTIVE | COMMUNITY
Start: 2023-05-19 | End: 1900-01-01

## 2023-05-28 NOTE — OB PROVIDER H&P - NSWEIGHT2_OBGYN_ALL_OB_NU
Patient notified that she tested negative for strep. A prednisone burst was sent in to [facility]. Her COVID testing is still pending so she will be notified when available. She should quarantine as much as possible and wear a mask if in public. She verbalized understanding and had no further questions.    [id number]

## 2023-06-12 DIAGNOSIS — R92.2 INCONCLUSIVE MAMMOGRAM: ICD-10-CM

## 2023-06-26 PROBLEM — R92.2 DENSE BREASTS: Status: ACTIVE | Noted: 2023-06-26

## 2023-10-25 NOTE — OB RN DELIVERY SUMMARY - NS_PLACENTA_OBGYN_ALL_OB_DT
1. We discussed the risks of surgery including bleeding, infection, as well as the cost and pain related to the procedure. Benefits of excision include resolution of symptoms and definitive tissue diagnosis. Alternatives include close observation. The patient understands, agrees, and wishes to proceed with excision  2. We also discussed anesthesia options including general anesthesia, local anesthetic only, and local anesthetic with sedation. General anesthesia provides the most patient relaxation but at the cost of higher risk as it includes endotracheal intubation. Local anesthetic poses the least risk to the patient but is the most uncomfortable. Additional benefit of local anesthetic only is that the patient could drive home from the procedure. The patient has chosen local anesthetic only  3. Will schedule for excision of skin lesion on left arm with local anesthetic only as outpatient procedure to be performed in office   4.   Can continue Plavix, counseled that she is at increased risk for bleeding and bruising after the procedure which should be well controlled with pressure
No.
23-Mar-2020 18:32

## 2024-05-30 ENCOUNTER — APPOINTMENT (OUTPATIENT)
Dept: OBGYN | Facility: CLINIC | Age: 44
End: 2024-05-30
Payer: COMMERCIAL

## 2024-05-30 VITALS
BODY MASS INDEX: 23.19 KG/M2 | SYSTOLIC BLOOD PRESSURE: 154 MMHG | DIASTOLIC BLOOD PRESSURE: 87 MMHG | WEIGHT: 153 LBS | HEIGHT: 68 IN

## 2024-05-30 DIAGNOSIS — Z00.00 ENCOUNTER FOR GENERAL ADULT MEDICAL EXAMINATION W/OUT ABNORMAL FINDINGS: ICD-10-CM

## 2024-05-30 DIAGNOSIS — Z01.419 ENCOUNTER FOR GYNECOLOGICAL EXAMINATION (GENERAL) (ROUTINE) W/OUT ABNORMAL FINDINGS: ICD-10-CM

## 2024-05-30 LAB
BILIRUB UR QL STRIP: NORMAL
GLUCOSE UR-MCNC: NORMAL
HCG UR QL: 0.2 EU/DL
HCG UR QL: NEGATIVE
HGB UR QL STRIP.AUTO: NORMAL
KETONES UR-MCNC: NORMAL
LEUKOCYTE ESTERASE UR QL STRIP: NORMAL
NITRITE UR QL STRIP: NORMAL
PH UR STRIP: 6.5
PROT UR STRIP-MCNC: NORMAL
SP GR UR STRIP: 1.01

## 2024-05-30 PROCEDURE — 81003 URINALYSIS AUTO W/O SCOPE: CPT | Mod: QW

## 2024-05-30 PROCEDURE — 99396 PREV VISIT EST AGE 40-64: CPT

## 2024-05-30 PROCEDURE — 99459 PELVIC EXAMINATION: CPT

## 2024-05-30 PROCEDURE — 81025 URINE PREGNANCY TEST: CPT

## 2024-05-30 NOTE — PHYSICAL EXAM
[Chaperone Present] : A chaperone was present in the examining room during all aspects of the physical examination [[id number]] : A chaperone was present during the pelvic exam. [Appropriately responsive] : appropriately responsive [Alert] : alert [No Acute Distress] : no acute distress [Soft] : soft [Non-tender] : non-tender [Non-distended] : non-distended [No HSM] : No HSM [No Lesions] : no lesions [No Mass] : no mass [Oriented x3] : oriented x3 [Examination Of The Breasts] : a normal appearance [No Masses] : no breast masses were palpable [Labia Majora] : normal [Labia Minora] : normal [Normal] : normal [Uterine Adnexae] : normal

## 2024-05-30 NOTE — COUNSELING
[Nutrition/ Exercise/ Weight Management] : nutrition, exercise, weight management [Body Image] : body image [Breast Self Exam] : breast self exam [Bladder Hygiene] : bladder hygiene [Contraception/ Emergency Contraception/ Safe Sexual Practices] : contraception, emergency contraception, safe sexual practices [Preconception Care/ Fertility options] : preconception care, fertility options [Pregnancy Options] : pregnancy options [Lab Results] : lab results [FreeTextEntry2] : fecundity

## 2024-05-30 NOTE — HISTORY OF PRESENT ILLNESS
[FreeTextEntry1] : pt comes in for annual exam  lmp 2024  x 8 , no complications  no meds no sob, no cp, full rom, no dysuria  mammo 2023 urine dip neg for uti  urine dip neg for preg

## 2024-05-30 NOTE — PLAN
[FreeTextEntry1] : annual exam  gc chl sent from the office  declines bc  discussed rates of pregnancy, miscarriage, fecundity will go for ulisses and breast sono  hx of breast cyst on sono from 12/2023

## 2024-05-31 LAB
C TRACH RRNA SPEC QL NAA+PROBE: NOT DETECTED
N GONORRHOEA RRNA SPEC QL NAA+PROBE: NOT DETECTED
SOURCE AMPLIFICATION: NORMAL

## 2024-07-18 NOTE — OB RN PATIENT PROFILE - AS SC BRADEN MOBILITY
RCVD pt from procedure room, report rcvd from RN. Bandaid dry and intact. VS obtained PT stable. Discharge instructions reviewed pt verbalizes understanding  
(4) no limitation

## 2024-09-11 ENCOUNTER — APPOINTMENT (OUTPATIENT)
Dept: OBGYN | Facility: CLINIC | Age: 44
End: 2024-09-11
Payer: COMMERCIAL

## 2024-09-11 VITALS
BODY MASS INDEX: 23.87 KG/M2 | WEIGHT: 157 LBS | HEART RATE: 103 BPM | SYSTOLIC BLOOD PRESSURE: 138 MMHG | DIASTOLIC BLOOD PRESSURE: 83 MMHG

## 2024-09-11 DIAGNOSIS — Z34.90 ENCOUNTER FOR SUPERVISION OF NORMAL PREGNANCY, UNSPECIFIED, UNSPECIFIED TRIMESTER: ICD-10-CM

## 2024-09-11 LAB
BILIRUB UR QL STRIP: NORMAL
GLUCOSE UR-MCNC: NORMAL
HCG UR QL: 0.2 EU/DL
HCG UR QL: POSITIVE
HGB UR QL STRIP.AUTO: NORMAL
KETONES UR-MCNC: NORMAL
LEUKOCYTE ESTERASE UR QL STRIP: NORMAL
NITRITE UR QL STRIP: NORMAL
PH UR STRIP: 6.5
PROT UR STRIP-MCNC: NORMAL
QUALITY CONTROL: YES
SP GR UR STRIP: 1.01

## 2024-09-11 PROCEDURE — 81003 URINALYSIS AUTO W/O SCOPE: CPT | Mod: QW

## 2024-09-11 PROCEDURE — 81025 URINE PREGNANCY TEST: CPT

## 2024-09-11 PROCEDURE — 99459 PELVIC EXAMINATION: CPT

## 2024-09-11 PROCEDURE — 76817 TRANSVAGINAL US OBSTETRIC: CPT

## 2024-09-11 PROCEDURE — 99213 OFFICE O/P EST LOW 20 MIN: CPT | Mod: 25

## 2024-09-11 NOTE — HISTORY OF PRESENT ILLNESS
[FreeTextEntry1] : pt come sin for prenatal car e lmp /2-24  x 8 largest 8-4 no complications  no meds no sob, no cp, no dysuria

## 2024-09-11 NOTE — PHYSICAL EXAM
[Chaperone Present] : A chaperone was present in the examining room during all aspects of the physical examination [[id number]] : A chaperone was present during the pelvic exam. [Appropriately responsive] : appropriately responsive [Alert] : alert [No Acute Distress] : no acute distress [Soft] : soft [Non-tender] : non-tender [Non-distended] : non-distended [No HSM] : No HSM [No Lesions] : no lesions [No Mass] : no mass [Oriented x3] : oriented x3 [Labia Majora] : normal [Labia Minora] : normal [Normal] : normal [Uterine Adnexae] : normal

## 2024-09-12 ENCOUNTER — NON-APPOINTMENT (OUTPATIENT)
Age: 44
End: 2024-09-12

## 2024-09-12 LAB
ABO + RH PNL BLD: NORMAL
BASOPHILS # BLD AUTO: 0.05 K/UL
BASOPHILS NFR BLD AUTO: 0.4 %
BLD GP AB SCN SERPL QL: NORMAL
EOSINOPHIL # BLD AUTO: 0.04 K/UL
EOSINOPHIL NFR BLD AUTO: 0.4 %
HCT VFR BLD CALC: 40.2 %
HGB BLD-MCNC: 12.7 G/DL
HIV1+2 AB SPEC QL IA.RAPID: NONREACTIVE
IMM GRANULOCYTES NFR BLD AUTO: 0.6 %
LYMPHOCYTES # BLD AUTO: 1.44 K/UL
LYMPHOCYTES NFR BLD AUTO: 12.6 %
MAN DIFF?: NORMAL
MCHC RBC-ENTMCNC: 28.4 PG
MCHC RBC-ENTMCNC: 31.6 GM/DL
MCV RBC AUTO: 89.9 FL
MONOCYTES # BLD AUTO: 0.63 K/UL
MONOCYTES NFR BLD AUTO: 5.5 %
NEUTROPHILS # BLD AUTO: 9.16 K/UL
NEUTROPHILS NFR BLD AUTO: 80.5 %
PLATELET # BLD AUTO: 247 K/UL
PROGEST SERPL-MCNC: 12.1 NG/ML
RBC # BLD: 4.47 M/UL
RBC # FLD: 15.1 %
T PALLIDUM AB SER QL IA: NEGATIVE
WBC # FLD AUTO: 11.39 K/UL

## 2024-09-13 LAB
B19V IGG SER QL IA: 5.63 INDEX
B19V IGG+IGM SER-IMP: NORMAL
B19V IGG+IGM SER-IMP: POSITIVE
B19V IGM FLD-ACNC: 0.18 INDEX
B19V IGM SER-ACNC: NEGATIVE
BACTERIA UR CULT: NORMAL
HBV SURFACE AG SER QL: ABNORMAL
HCV AB SER QL: NONREACTIVE
HCV S/CO RATIO: 0.12 S/CO
LEAD BLD-MCNC: <1 UG/DL
MEV IGG FLD QL IA: 108 AU/ML
MEV IGG+IGM SER-IMP: POSITIVE
MUV AB SER-ACNC: POSITIVE
MUV IGG SER QL IA: 15.5 AU/ML
RUBV IGG FLD-ACNC: 1.22 INDEX
RUBV IGG SER-IMP: POSITIVE
VZV AB TITR SER: POSITIVE
VZV IGG SER IF-ACNC: 6.33 S/CO

## 2024-09-23 ENCOUNTER — APPOINTMENT (OUTPATIENT)
Dept: OBGYN | Facility: CLINIC | Age: 44
End: 2024-09-23
Payer: COMMERCIAL

## 2024-09-23 ENCOUNTER — NON-APPOINTMENT (OUTPATIENT)
Age: 44
End: 2024-09-23

## 2024-09-23 VITALS
WEIGHT: 159 LBS | HEIGHT: 69 IN | SYSTOLIC BLOOD PRESSURE: 144 MMHG | BODY MASS INDEX: 23.55 KG/M2 | DIASTOLIC BLOOD PRESSURE: 84 MMHG | HEART RATE: 91 BPM

## 2024-09-23 DIAGNOSIS — O02.1 MISSED ABORTION: ICD-10-CM

## 2024-09-23 LAB
BILIRUB UR QL STRIP: NORMAL
GLUCOSE UR-MCNC: NORMAL
HCG UR QL: 0.2 EU/DL
HGB UR QL STRIP.AUTO: NORMAL
KETONES UR-MCNC: NORMAL
LEUKOCYTE ESTERASE UR QL STRIP: NORMAL
NITRITE UR QL STRIP: NORMAL
PH UR STRIP: 6.5
PROT UR STRIP-MCNC: NORMAL
SP GR UR STRIP: 1.02

## 2024-09-23 PROCEDURE — 76817 TRANSVAGINAL US OBSTETRIC: CPT

## 2024-09-23 PROCEDURE — 81003 URINALYSIS AUTO W/O SCOPE: CPT | Mod: QW

## 2024-09-23 PROCEDURE — 99213 OFFICE O/P EST LOW 20 MIN: CPT | Mod: 25

## 2024-09-23 PROCEDURE — 99459 PELVIC EXAMINATION: CPT

## 2024-09-30 ENCOUNTER — APPOINTMENT (OUTPATIENT)
Dept: OBGYN | Facility: CLINIC | Age: 44
End: 2024-09-30
Payer: COMMERCIAL

## 2024-09-30 VITALS
SYSTOLIC BLOOD PRESSURE: 129 MMHG | BODY MASS INDEX: 23.48 KG/M2 | WEIGHT: 159 LBS | DIASTOLIC BLOOD PRESSURE: 85 MMHG | HEART RATE: 101 BPM

## 2024-09-30 DIAGNOSIS — Z34.90 ENCOUNTER FOR SUPERVISION OF NORMAL PREGNANCY, UNSPECIFIED, UNSPECIFIED TRIMESTER: ICD-10-CM

## 2024-09-30 DIAGNOSIS — O20.9 HEMORRHAGE IN EARLY PREGNANCY, UNSPECIFIED: ICD-10-CM

## 2024-09-30 PROBLEM — O02.1 MISSED ABORTION: Status: ACTIVE | Noted: 2024-09-30 | Resolved: 2024-12-29

## 2024-09-30 LAB
BILIRUB UR QL STRIP: NORMAL
GLUCOSE UR-MCNC: NORMAL
HCG UR QL: 0.2 EU/DL
HGB UR QL STRIP.AUTO: NORMAL
KETONES UR-MCNC: NORMAL
LEUKOCYTE ESTERASE UR QL STRIP: NORMAL
NITRITE UR QL STRIP: NORMAL
PH UR STRIP: 6.5
PROT UR STRIP-MCNC: NORMAL
SP GR UR STRIP: 1.01

## 2024-09-30 PROCEDURE — 76817 TRANSVAGINAL US OBSTETRIC: CPT

## 2024-09-30 PROCEDURE — 81003 URINALYSIS AUTO W/O SCOPE: CPT | Mod: QW

## 2024-09-30 PROCEDURE — 99459 PELVIC EXAMINATION: CPT

## 2024-09-30 PROCEDURE — 99213 OFFICE O/P EST LOW 20 MIN: CPT | Mod: 25

## 2024-09-30 NOTE — PHYSICAL EXAM
[Chaperone Present] : A chaperone was present in the examining room during all aspects of the physical examination [[id number]] : A chaperone was present during the pelvic exam. [Soft] : soft [Non-tender] : non-tender [Non-distended] : non-distended [No Mass] : no mass [Oriented x3] : oriented x3 [Labia Majora] : normal [Labia Minora] : normal [Scant] : There was scant vaginal bleeding [Normal] : normal [Uterine Adnexae] : normal

## 2024-09-30 NOTE — HISTORY OF PRESENT ILLNESS
[FreeTextEntry1] : pt was seen 1 wk ago, had small fetal pole no FH now having light bleeding for the past few days mild cramping

## 2024-09-30 NOTE — PHYSICAL EXAM
[Chaperone Present] : A chaperone was present in the examining room during all aspects of the physical examination [87161] : A chaperone was present during the pelvic exam. [Appropriately responsive] : appropriately responsive [Alert] : alert [No Acute Distress] : no acute distress [Soft] : soft [Non-tender] : non-tender [Non-distended] : non-distended [No HSM] : No HSM [No Lesions] : no lesions [No Mass] : no mass [Oriented x3] : oriented x3 [Labia Majora] : normal [Labia Minora] : normal [Normal] : normal [Uterine Adnexae] : normal

## 2024-09-30 NOTE — PROCEDURE
[Transvaginal OB Sonogram] : Transvaginal OB Sonogram [Intrauterine Pregnancy] : intrauterine pregnancy [Yolk Sac] : yolk sac present [Fetal Heart] : no fetal heart [CRL: ___ (mm)] : CRL - [unfilled]Umm [Current GA by Sonogram: ___ (wks)] : Current GA by Sonogram: [unfilled]Uwks [___ day(s)] : [unfilled] days [FreeTextEntry1] : the fetal pole has not grown since last sono, no FH  the gestational sac has grown now 2.82 cm\ in diam.   missed

## 2024-09-30 NOTE — COUNSELING
[Nutrition/ Exercise/ Weight Management] : nutrition, exercise, weight management [Body Image] : body image [Breast Self Exam] : breast self exam [Contraception/ Emergency Contraception/ Safe Sexual Practices] : contraception, emergency contraception, safe sexual practices [Preconception Care/ Fertility options] : preconception care, fertility options [Pregnancy Options] : pregnancy options [FreeTextEntry2] : miscarriage, risk of hemorrhage , d&c to evacuate the uterus

## 2024-09-30 NOTE — PLAN
[FreeTextEntry1] : missed ab  discussed options  pt wants to wait for spont passage  had hemorrhage last time this happened, although the pregnancy was more advanced  strongly recommend d&c  pt will return  1 week , if does not pass tissue by then will need d&c  precautions given

## 2024-09-30 NOTE — HISTORY OF PRESENT ILLNESS
[FreeTextEntry1] : pt come sin for a follow up visit  was seen 10 days ago  was noted to have an eraly iup, no fh was seen at that jul[facility] e today pt presents no complaint s no vag bleeidng  no sob, no cp, full rom, no dysuria  no meds

## 2024-09-30 NOTE — PROCEDURE
[Transvaginal OB Sonogram] : Transvaginal OB Sonogram [Intrauterine Pregnancy] : intrauterine pregnancy [Yolk Sac] : yolk sac present [Fetal Heart] : fetal heart present [Transvaginal OB Sonogram WNL] : Transvaginal OB Sonogram WNL [FreeTextEntry1] : gets sac at 2 cm, no fh, no fetal pole , empty sac

## 2024-09-30 NOTE — PLAN
[FreeTextEntry1] : impression: missed  pt had previous spon ab when she hemorrhaged pt very anxious to do this again will try to schedule pt to call if bleeding becomes heavy

## 2024-10-01 ENCOUNTER — RESULT REVIEW (OUTPATIENT)
Age: 44
End: 2024-10-01

## 2024-10-01 ENCOUNTER — TRANSCRIPTION ENCOUNTER (OUTPATIENT)
Age: 44
End: 2024-10-01

## 2024-10-01 ENCOUNTER — OUTPATIENT (OUTPATIENT)
Dept: OUTPATIENT SERVICES | Facility: HOSPITAL | Age: 44
LOS: 1 days | Discharge: HOME | End: 2024-10-01
Payer: COMMERCIAL

## 2024-10-01 VITALS
TEMPERATURE: 98 F | DIASTOLIC BLOOD PRESSURE: 69 MMHG | SYSTOLIC BLOOD PRESSURE: 131 MMHG | RESPIRATION RATE: 16 BRPM | OXYGEN SATURATION: 100 % | HEART RATE: 64 BPM

## 2024-10-01 VITALS
HEART RATE: 76 BPM | DIASTOLIC BLOOD PRESSURE: 75 MMHG | OXYGEN SATURATION: 99 % | SYSTOLIC BLOOD PRESSURE: 145 MMHG | TEMPERATURE: 98 F | WEIGHT: 158.95 LBS | HEIGHT: 69 IN | RESPIRATION RATE: 18 BRPM

## 2024-10-01 DIAGNOSIS — Z02.9 ENCOUNTER FOR ADMINISTRATIVE EXAMINATIONS, UNSPECIFIED: ICD-10-CM

## 2024-10-01 PROCEDURE — 88304 TISSUE EXAM BY PATHOLOGIST: CPT

## 2024-10-01 PROCEDURE — 59820 CARE OF MISCARRIAGE: CPT

## 2024-10-01 PROCEDURE — 88304 TISSUE EXAM BY PATHOLOGIST: CPT | Mod: 26

## 2024-10-01 PROCEDURE — 88305 TISSUE EXAM BY PATHOLOGIST: CPT | Mod: 26

## 2024-10-01 PROCEDURE — 88305 TISSUE EXAM BY PATHOLOGIST: CPT

## 2024-10-01 RX ORDER — OXYCODONE HYDROCHLORIDE 30 MG/1
5 TABLET, FILM COATED, EXTENDED RELEASE ORAL ONCE
Refills: 0 | Status: DISCONTINUED | OUTPATIENT
Start: 2024-10-01 | End: 2024-10-01

## 2024-10-01 RX ORDER — SODIUM CHLORIDE IRRIG SOLUTION 0.9 %
1000 SOLUTION, IRRIGATION IRRIGATION
Refills: 0 | Status: DISCONTINUED | OUTPATIENT
Start: 2024-10-01 | End: 2024-10-01

## 2024-10-01 RX ORDER — HYDROMORPHONE HYDROCHLORIDE 1 MG/ML
0.5 INJECTION, SOLUTION INTRAMUSCULAR; INTRAVENOUS; SUBCUTANEOUS
Refills: 0 | Status: DISCONTINUED | OUTPATIENT
Start: 2024-10-01 | End: 2024-10-01

## 2024-10-01 RX ORDER — ACETAMINOPHEN 325 MG
650 TABLET ORAL ONCE
Refills: 0 | Status: DISCONTINUED | OUTPATIENT
Start: 2024-10-01 | End: 2024-10-01

## 2024-10-01 RX ADMIN — Medication 100 MILLILITER(S): at 16:28

## 2024-10-01 NOTE — ASU DISCHARGE PLAN (ADULT/PEDIATRIC) - CARE PROVIDER_API CALL
Jocelyn Garcia  Obstetrics and Gynecology  [address] [place] [address]  Phone: [phone]  Fax: [phone]  Follow Up Time: 2 weeks

## 2024-10-01 NOTE — CHART NOTE - NSCHARTNOTEFT_GEN_A_CORE
PACU ANESTHESIA ADMISSION NOTE      Procedure: Dilation and curettage, uterus, using suction, for missed first trimester       Post op diagnosis:  Missed         ____  Intubated  TV:______       Rate: ______      FiO2: ______    _x___  Patent Airway    _x___  Full return of protective reflexes    __  Full recovery from anesthesia / back to baseline status    Vitals:            T: 98.3               BP : 106/56               R: 16             Sat:   100            P:  78      Mental Status:  _x___ Awake   _____ Alert   _____ Drowsy   _____ Sedated    Nausea/Vomiting:  _x___  NO       ______Yes,   See Post - Op Orders         Pain Scale (0-10):  __0___    Treatment: _ None    __x__ See Post - Op/PCA Orders    Post - Operative Fluids:   __x__ Oral   ____ See Post - Op Orders    Plan: Discharge:   _x___Home       _____Floor     _____Critical Care    _____  Other:_________________    Comments:  No anesthesia issues or complications noted.  Discharge when criteria met.

## 2024-10-01 NOTE — H&P PST ADULT - HISTORY OF PRESENT ILLNESS
42yo with a missed  presenting to [facility] for suction dilation and curettage.

## 2024-10-01 NOTE — BRIEF OPERATIVE NOTE - OPERATION/FINDINGS
EUA revealed normal vaginal mucosa, normal cervix, anteverted uterus, no ovarian fullness bilaterally.  Suction curettage performed with retrieval of uterine contents.  Sharp curettage was performed to ensure gritty texture was encountered circumferentially.  Adequate hemostasis noted.

## 2024-10-01 NOTE — ASU PATIENT PROFILE, ADULT - FALL HARM RISK - UNIVERSAL INTERVENTIONS
Bed in lowest position, wheels locked, appropriate side rails in place/Call bell, personal items and telephone in reach/Instruct patient to call for assistance before getting out of bed or chair/Non-slip footwear when patient is out of bed/[place] to call system/Physically safe environment - no spills, clutter or unnecessary equipment/Purposeful Proactive Rounding/Room/bathroom lighting operational, light cord in reach

## 2024-10-01 NOTE — BRIEF OPERATIVE NOTE - NSICDXBRIEFPROCEDURE_GEN_ALL_CORE_FT
PROCEDURES:  Dilation and curettage, uterus, using suction, for missed first trimester  01-Oct-2024 16:19:28  Benito Pepe

## 2024-10-02 LAB — SURGICAL PATHOLOGY STUDY: SIGNIFICANT CHANGE UP

## 2024-10-04 DIAGNOSIS — O02.1 MISSED ABORTION: ICD-10-CM

## 2024-10-15 ENCOUNTER — APPOINTMENT (OUTPATIENT)
Dept: OBGYN | Facility: CLINIC | Age: 44
End: 2024-10-15
Payer: COMMERCIAL

## 2024-10-15 VITALS
HEART RATE: 91 BPM | BODY MASS INDEX: 23.4 KG/M2 | DIASTOLIC BLOOD PRESSURE: 79 MMHG | SYSTOLIC BLOOD PRESSURE: 118 MMHG | HEIGHT: 69 IN | WEIGHT: 158 LBS

## 2024-10-15 DIAGNOSIS — Z98.890 OTHER SPECIFIED POSTPROCEDURAL STATES: ICD-10-CM

## 2024-10-15 PROCEDURE — 99024 POSTOP FOLLOW-UP VISIT: CPT

## 2024-10-28 ENCOUNTER — NON-APPOINTMENT (OUTPATIENT)
Age: 44
End: 2024-10-28

## 2025-01-02 NOTE — OB PROVIDER H&P - PRO RUBELLA INFANT
Get labs in about 4 months.    Get a standard ultrasound of the liver in about 4 months.    Get a FibroScan ultrasound of the liver in about 4 months.    Talk with Dr. Jackson about possibly getting upper endoscopy.    See me back in clinic in about 5 months.    Work on diet, weight loss and exercise.   immune

## 2025-06-19 ENCOUNTER — APPOINTMENT (OUTPATIENT)
Dept: OBGYN | Facility: CLINIC | Age: 45
End: 2025-06-19

## 2025-07-03 RX ORDER — NORETHINDRONE ACETATE 5 MG/1
5 TABLET ORAL DAILY
Qty: 60 | Refills: 1 | Status: ACTIVE | COMMUNITY
Start: 2025-07-03 | End: 1900-01-01